# Patient Record
Sex: FEMALE | Race: OTHER | URBAN - NONMETROPOLITAN AREA
[De-identification: names, ages, dates, MRNs, and addresses within clinical notes are randomized per-mention and may not be internally consistent; named-entity substitution may affect disease eponyms.]

---

## 2024-07-08 ENCOUNTER — TELEPHONE (OUTPATIENT)
Dept: NEUROLOGY | Age: 39
End: 2024-07-08

## 2024-07-08 NOTE — TELEPHONE ENCOUNTER
I have called and spoke with Alexander. I let alexander know that we have not received a referral for this patient. He stated that they was suppose to send the referral to [facility]. I let him know that this is Karen Watkins. He stated he will call [facility].

## 2024-07-09 ENCOUNTER — TELEPHONE (OUTPATIENT)
Dept: NEUROLOGY | Age: 39
End: 2024-07-09

## 2024-07-09 NOTE — TELEPHONE ENCOUNTER
Patient  called to see if the office received ED referral from [facility].  Alexander said that was faxed this afternoon.  Please called the patient.    Thank you

## 2024-07-10 NOTE — TELEPHONE ENCOUNTER
Patient  called the office again to see if we had gotten the referral from [facility]. I let patient  know that we still have not received a referral from [facility] for this patient.    no

## 2024-09-06 VITALS — HEIGHT: 62 IN | WEIGHT: 140 LBS | BODY MASS INDEX: 25.76 KG/M2

## 2024-09-06 RX ORDER — LEVETIRACETAM 1000 MG/1
1000 TABLET ORAL 2 TIMES DAILY
COMMUNITY
Start: 2024-07-07